# Patient Record
Sex: MALE | NOT HISPANIC OR LATINO | URBAN - METROPOLITAN AREA
[De-identification: names, ages, dates, MRNs, and addresses within clinical notes are randomized per-mention and may not be internally consistent; named-entity substitution may affect disease eponyms.]

---

## 2021-07-19 ENCOUNTER — IMPORTED ENCOUNTER (OUTPATIENT)
Dept: URBAN - METROPOLITAN AREA CLINIC 1 | Facility: CLINIC | Age: 56
End: 2021-07-19

## 2021-07-19 PROBLEM — H52.223: Noted: 2021-07-19

## 2021-07-19 PROBLEM — H52.4: Noted: 2021-07-19

## 2021-07-19 PROBLEM — H44.23: Noted: 2021-07-19

## 2021-07-19 PROCEDURE — S0620 ROUTINE OPHTHALMOLOGICAL EXA: HCPCS

## 2021-07-19 NOTE — PATIENT DISCUSSION
1.  High Myopia with Astigmatism OU -- Rx was given for correction if indicated and requested. 2. Presbyopia3. COAG OU -- IOP 19/18 today. Continue Latanoprost QHS OU. (FHx). Advised to keep follow ups with [facility]. 4. Cataracts OU -- Observe. 5.  [facility] for an appointment in 1 year 36 with Dr. Minda Snyder.

## 2022-04-02 ASSESSMENT — TONOMETRY
OS_IOP_MMHG: 18
OD_IOP_MMHG: 19

## 2022-04-02 ASSESSMENT — VISUAL ACUITY
OS_CC: J1+
OS_SC: 20/20
OD_CC: J1+
OD_SC: 20/20

## 2022-07-25 ENCOUNTER — COMPREHENSIVE EXAM (OUTPATIENT)
Dept: URBAN - METROPOLITAN AREA CLINIC 1 | Facility: CLINIC | Age: 57
End: 2022-07-25

## 2022-07-25 DIAGNOSIS — H44.23: ICD-10-CM

## 2022-07-25 DIAGNOSIS — H52.223: ICD-10-CM

## 2022-07-25 DIAGNOSIS — H52.4: ICD-10-CM

## 2022-07-25 DIAGNOSIS — Z01.00: ICD-10-CM

## 2022-07-25 PROCEDURE — 92014 COMPRE OPH EXAM EST PT 1/>: CPT

## 2022-07-25 ASSESSMENT — TONOMETRY
OS_IOP_MMHG: 15
OD_IOP_MMHG: 15

## 2022-07-25 NOTE — PATIENT DISCUSSION
(CD .65 OU) IOP 15OU today. Continue Latanoprost QHS OU. (FHx). Advised to keep follow ups with [facility].

## 2023-09-18 ENCOUNTER — COMPREHENSIVE EXAM (OUTPATIENT)
Dept: URBAN - METROPOLITAN AREA CLINIC 1 | Facility: CLINIC | Age: 58
End: 2023-09-18

## 2023-09-18 DIAGNOSIS — H52.4: ICD-10-CM

## 2023-09-18 DIAGNOSIS — H52.223: ICD-10-CM

## 2023-09-18 DIAGNOSIS — H52.13: ICD-10-CM

## 2023-09-18 DIAGNOSIS — Z01.00: ICD-10-CM

## 2023-09-18 PROCEDURE — 92014 COMPRE OPH EXAM EST PT 1/>: CPT

## 2023-09-18 PROCEDURE — 92015 DETERMINE REFRACTIVE STATE: CPT

## 2023-09-18 ASSESSMENT — VISUAL ACUITY
OU_CC: J1+
OD_CC: 20/20
OS_CC: 20/20

## 2023-09-18 ASSESSMENT — TONOMETRY
OS_IOP_MMHG: 15
OD_IOP_MMHG: 15

## 2024-12-02 ENCOUNTER — COMPREHENSIVE EXAM (OUTPATIENT)
Age: 59
End: 2024-12-02

## 2024-12-02 DIAGNOSIS — Z01.00: ICD-10-CM

## 2024-12-02 DIAGNOSIS — H52.223: ICD-10-CM

## 2024-12-02 DIAGNOSIS — H52.4: ICD-10-CM

## 2024-12-02 DIAGNOSIS — H52.13: ICD-10-CM

## 2024-12-02 PROCEDURE — 92015 DETERMINE REFRACTIVE STATE: CPT

## 2024-12-02 PROCEDURE — 92014 COMPRE OPH EXAM EST PT 1/>: CPT
